# Patient Record
Sex: MALE | Race: WHITE | NOT HISPANIC OR LATINO | Employment: OTHER | URBAN - METROPOLITAN AREA
[De-identification: names, ages, dates, MRNs, and addresses within clinical notes are randomized per-mention and may not be internally consistent; named-entity substitution may affect disease eponyms.]

---

## 2023-10-01 ENCOUNTER — HOSPITAL ENCOUNTER (EMERGENCY)
Facility: HOSPITAL | Age: 78
Discharge: HOME | End: 2023-10-01
Attending: EMERGENCY MEDICINE
Payer: MEDICARE

## 2023-10-01 ENCOUNTER — APPOINTMENT (OUTPATIENT)
Dept: RADIOLOGY | Facility: HOSPITAL | Age: 78
End: 2023-10-01
Payer: MEDICARE

## 2023-10-01 VITALS
SYSTOLIC BLOOD PRESSURE: 150 MMHG | RESPIRATION RATE: 16 BRPM | HEART RATE: 48 BPM | OXYGEN SATURATION: 95 % | WEIGHT: 151.01 LBS | TEMPERATURE: 97.9 F | DIASTOLIC BLOOD PRESSURE: 53 MMHG | HEIGHT: 64 IN | BODY MASS INDEX: 25.78 KG/M2

## 2023-10-01 DIAGNOSIS — S62.356A CLOSED NONDISPLACED FRACTURE OF SHAFT OF FIFTH METACARPAL BONE OF RIGHT HAND, INITIAL ENCOUNTER: Primary | ICD-10-CM

## 2023-10-01 PROCEDURE — 73630 X-RAY EXAM OF FOOT: CPT | Mod: RT

## 2023-10-01 PROCEDURE — 99283 EMERGENCY DEPT VISIT LOW MDM: CPT | Performed by: EMERGENCY MEDICINE

## 2023-10-01 PROCEDURE — 2500000001 HC RX 250 WO HCPCS SELF ADMINISTERED DRUGS (ALT 637 FOR MEDICARE OP): Performed by: EMERGENCY MEDICINE

## 2023-10-01 RX ORDER — IBUPROFEN 600 MG/1
600 TABLET ORAL ONCE
Status: COMPLETED | OUTPATIENT
Start: 2023-10-01 | End: 2023-10-01

## 2023-10-01 RX ADMIN — IBUPROFEN 600 MG: 600 TABLET, FILM COATED ORAL at 10:51

## 2023-10-01 ASSESSMENT — PAIN SCALES - PAIN ASSESSMENT IN ADVANCED DEMENTIA (PAINAD): BREATHING: NORMAL

## 2023-10-01 ASSESSMENT — PAIN SCALES - GENERAL
PAINLEVEL_OUTOF10: 5 - MODERATE PAIN
PAINLEVEL_OUTOF10: 1

## 2023-10-01 ASSESSMENT — PAIN - FUNCTIONAL ASSESSMENT: PAIN_FUNCTIONAL_ASSESSMENT: 0-10

## 2023-10-01 NOTE — ED PROVIDER NOTES
EMERGENCY DEPARTMENT ENCOUNTER      [ ] CODE STEMI [ ] CODE Neuro [ ] CODE Yellow [ ] Modified Trauma [ ] CODE Blue      CHIEF COMPLAINT      Chief Complaint   Patient presents with    Foot Injury     Pt state last night he tripped and fell injuring his right foot.  Contusion noted on the top of the right foot.  MSPs intact.         Mode of Arrival:Car  Primary Care Provider: Jason Li MD  Medical Record Number: [id number]      History obtained by: Patient  Limited by nothing  Time seen: @NOWtimed@      QUALITY MEASURES   PPE Utilized: N95 with goggles and gloves      HPI      Troy Colmenares is a 78 y.o. male with a history of BPH on Flomax, otherwise on simvastatin, denies other medical history and not on blood thinners, states that yesterday while he was out in his yard he had tripped on his [employer] and landed on his right foot laterally and states that since then he has had some pain and some trouble walking due to the pain mostly in the lateral aspect of his right foot.  Denies any numbness or tingling.  Started to notice a bruise forming in the dorsal aspect of the foot.  Otherwise has full range of motion of the ankle denies any injury elsewhere in the fall, did not hit his head or lose consciousness.  Did not take any pain medication for relief but did ice the area.  No other complaints.           The patient has no other complaints at this time.               PAST MEDICAL HISTORY    No past medical history on file.      I have personally reviewed the patient's past medical history in the records.  Paddy Grissom MD    SURGICAL HISTORY    No past surgical history on file.    I have personally reviewed the patient's past surgical history in the records.  Paddy Grissom MD    CURRENT MEDICATIONS    I have reviewed the patient’s medications.   Please see nursing and pharmacy records for the most up to date list.     [unfilled]    ALLERGIES    No Known Allergies    I have personally reviewed the  "patient's past history of allergies in the records.  Paddy Grissom MD    FAMILY HISTORY    No family history on file.    I have personally reviewed the patient's family history in the records.  Paddy Grissom MD    SOCIAL HISTORY    Social History     Socioeconomic History    Marital status:      Spouse name: Not on file    Number of children: Not on file    Years of education: Not on file    Highest education level: Not on file   Occupational History    Not on file   Tobacco Use    Smoking status: Not on file    Smokeless tobacco: Not on file   Substance and Sexual Activity    Alcohol use: Not on file    Drug use: Not on file    Sexual activity: Not on file   Other Topics Concern    Not on file   Social History Narrative    Not on file     Social Determinants of Health     Financial Resource Strain: Not on file   Food Insecurity: Not on file   Transportation Needs: Not on file   Physical Activity: Not on file   Stress: Not on file   Social Connections: Not on file   Intimate Partner Violence: Not on file   Housing Stability: Not on file         I have personally reviewed the patient's social history in the records.  Paddy Grissom MD    REVIEW OF SYSTEMS      6 point ROS was reviewed and negative except as noted above in HPI.      PHYSICAL EXAM    VITAL SIGNS:    /53 (BP Location: Left arm, Patient Position: Sitting)   Pulse (!) 48   Temp 36.6 °C (97.9 °F) (Oral)   Resp 16   Ht 1.626 m (5' 4\")   Wt 68.5 kg (151 lb 0.2 oz)   SpO2 95%   BMI 25.92 kg/m²    Review EMR for vital signs  Nursing note and vitals reviewed.    Constitutional:  Alert and oriented, well-developed, well-nourished, appears stated age, non-toxic appearing  HENT:  Normocephalic, atraumatic, bilateral external ears normal, oropharynx moist, Nose normal.  Neck: normal range of motion, no tenderness, supple, no stridor.  Eyes:  PERRL, EOMI, conjunctiva normal, no discharge.   Cardiovascular:  Normal heart rate, normal rhythm on pulse " check  Respiratory:  Normal breath sounds, no respiratory distress  Integument:  Warm, dry, no erythema, no rash, no edema.   Musculoskeletal: Right foot does show very early contusion of the dorsal aspect but no tenderness in the area with DP PT pulses 2+ capillary refills less than 2 seconds neurovascular intact.  On the distal lateral edge of foot there is some tenderness palpation but no obvious crepitus or deformity or open wound.  intact distal pulses, no tenderness, no cyanosis, no clubbing. Good range of motion in all major joints. No tenderness to palpation or major deformities noted.    Neurologic:  Alert & oriented x 3, normal motor function, normal sensory function, no focal deficits noted. Cranial nerves II-XII intact.      EKG  None         Reviewed and interpreted by me, Paddy Grissom MD           RADIOLOGY  XR foot right 3+ views   Final Result   Acute, comminuted but nondisplaced fractures of the mid and distal   diaphysis of the 5th metatarsal.        MACRO:   None.        Signed by: Darrell Billy 10/1/2023 9:43 AM   Dictation workstation:   [id number]          All Imaging studies evaluated and interpreted by ED physician except when noted otherwise.    ED PROVIDER INTERPRETATION (XRAYS ONLY):       *I have interpreted the x-ray real-time in the ED myself, and made a clinical decision on it prior to the formal radiology reading.    Paddy Grissom M.D.    RADIOLOGIST IMPRESSION (U/S, CT, MRI):   XR foot right 3+ views   Final Result   Acute, comminuted but nondisplaced fractures of the mid and distal   diaphysis of the 5th metatarsal.        MACRO:   None.        Signed by: Darrell Billy 10/1/2023 9:43 AM   Dictation workstation:   [id number]            PERTINENT LABS    Please refer to the chart for all lab work and to MDM for relevant discussion.      PROCEDURE    None (procdoc)    ED COURSE & MEDICAL DECISION MAKING    Pertinent Labs & Imaging studies reviewed. (See chart for  "details)    MDM:    Assessment: Troy Colmenares is a 78 y.o.male who presents to the ED with suspected morsel sprain or contusion of the right foot rather than an actual fracture but did tell patient for now obtain an x-ray while providing ibuprofen for pain relief and will reassess wall also providing crutches for patient to avoid placing weight on the foot for the next few days.  Patient understands and agrees with plan.        Prior records in EPIC reviewed by me.     2023 Coding Requirements:  --Independent historian(s):    see HPI  --Review of prior records:    EHR reviewed   --Relevant comorbidities:    see records  --Social determinants of health:          LE injury     I have considered the following with regards to this patient's   condition: Contusion, hematoma, laceration, arthritis, gout, cellulitis, DVT,   joint dislocation with possible vascular injury, fracture of the lower extremity,   compartment syndrome, internal derangement of joint, patellar dislocation,   peroneal nerve injury, sprain, strain.    X-ray did show shaft fracture of the fifth metatarsal that is nondisplaced so ordered patient cam boot along with crutches recommended ibuprofen rest ice and elevation gave name and number for podiatrist to follow-up with her final disposition although will likely heal with conservative measures.  Patient understands and agrees with plan          ED VITALS  Vitals:    10/01/23 0824   BP: 150/53   BP Location: Left arm   Patient Position: Sitting   Pulse: (!) 48   Resp: 16   Temp: 36.6 °C (97.9 °F)   TempSrc: Oral   SpO2: 95%   Weight: 68.5 kg (151 lb 0.2 oz)   Height: 1.626 m (5' 4\")       BP  Min: 150/53  Max: 150/53    As part of the 2022 MIPS reporting requirements, the following measures have been reviewed and documented:    None     1. Closed nondisplaced fracture of shaft of fifth metacarpal bone of right hand, initial encounter          DISPOSITION       DISCHARGE.  The patient is discharged " back to their place of residence.  Discharge diagnosis, instructions and plan were discussed and understood. At the time of discharge the patient was comfortable and was in no apparent distress. Patient is aware of diagnostic uncertainty and was notified though testing is negative here, there is a very small chance that pathology may be missed.  The patient understands these risks and the patient /family understood to return immediately to the emergency department if the symptoms worsen or if they have any additional concerns.    DISCHARGE MEDICATIONS  New Prescriptions    No medications on file       FOLLOW UP  No follow-up provider specified.    Paddy Grissom    This note was created with the assistance of voice recognition technology.  While attempts were made to ensure accuracy, mis-transcription may be present due to limitations in the software.        Electronically signed by MD Paddy Marrero MD  10/01/23 1037

## 2024-11-11 ENCOUNTER — HOSPITAL ENCOUNTER (OUTPATIENT)
Dept: RADIOLOGY | Facility: CLINIC | Age: 79
Discharge: HOME | End: 2024-11-11
Payer: MEDICARE

## 2024-11-11 ENCOUNTER — OFFICE VISIT (OUTPATIENT)
Dept: ORTHOPEDIC SURGERY | Facility: CLINIC | Age: 79
End: 2024-11-11
Payer: MEDICARE

## 2024-11-11 ENCOUNTER — APPOINTMENT (OUTPATIENT)
Dept: ORTHOPEDIC SURGERY | Facility: CLINIC | Age: 79
End: 2024-11-11
Payer: MEDICARE

## 2024-11-11 DIAGNOSIS — M41.56 SCOLIOSIS OF LUMBAR REGION DUE TO DEGENERATIVE DISEASE OF SPINE IN ADULT: ICD-10-CM

## 2024-11-11 DIAGNOSIS — M25.559 HIP PAIN, UNSPECIFIED LATERALITY: ICD-10-CM

## 2024-11-11 DIAGNOSIS — M70.61 GREATER TROCHANTERIC BURSITIS OF RIGHT HIP: ICD-10-CM

## 2024-11-11 DIAGNOSIS — M16.11 PRIMARY LOCALIZED OSTEOARTHRITIS OF RIGHT HIP: Primary | ICD-10-CM

## 2024-11-11 PROCEDURE — 73502 X-RAY EXAM HIP UNI 2-3 VIEWS: CPT | Mod: RIGHT SIDE | Performed by: RADIOLOGY

## 2024-11-11 PROCEDURE — 73502 X-RAY EXAM HIP UNI 2-3 VIEWS: CPT | Mod: RT

## 2024-11-11 PROCEDURE — 99203 OFFICE O/P NEW LOW 30 MIN: CPT | Performed by: ORTHOPAEDIC SURGERY

## 2024-11-11 PROCEDURE — 99213 OFFICE O/P EST LOW 20 MIN: CPT | Performed by: ORTHOPAEDIC SURGERY

## 2024-11-11 NOTE — PROGRESS NOTES
ORTHOPAEDIC HISTORY AND PHYSICAL    History Of Present Illness  Orthopaedic Problems/Injuries:  Troy Colmenares is a 79 y.o. male presenting with right hip pain.  Patient reports that his pain started in the right hip about 2 months ago.  He reported that he broke his right foot and was favoring the hip 2 months ago and pain started over the lateral aspect of the hip.  The pain also is in the groin area.  The pain happened intermittently.  He reports that he is highly functional and rides about 2000 miles every year.  He is still functional with the pain.  He denies numbness or tingling down the leg.  Of note patient have history of left hip replacement surgery 4 years ago.  He has done very well from that hip.      Review of Systems: 12 point ROS negative unless stated in HPI    Past Medical History  He has no past medical history on file.    Surgical History  He has no past surgical history on file.     Social History  He has no history on file for tobacco use, alcohol use, and drug use.    Family History  No family history on file.     Allergies  Patient has no known allergies.    Review of Systems     Physical Exam  Gen: The patient is alert and oriented ×3, is in no acute distress, and appear their stated age and weight.    Psychiatric: Mood and affect are appropriate.    Eyes: Sclera are white, and pupils are round and symmetric.    ENT: Mucous membranes are moist.     Neck: Supple. Thyroid is midline.    Respiratory: Respirations are nonlabored, chest rise is symmetric.    Cardiac: Rate is regular by palpation of distal pulses.     Abdomen: Nondistended.    Integument: No obvious cutaneous lesions are noted. No signs of lymphangitis. No signs of systemic edema.    Patient is able to ambulate without an antalgic gait .  Examination of the hip reveals no skin abnormalities.  Mild tenderness to palpation over the greater trochanter.  Range of motion of the hip reveals flexion past 90 degrees, patient has full  extension, 40 degrees of hip abduction, 30 degrees of abduction, internal rotation to 40 degrees and external rotation to 60 degrees.  There is no pain with rotational range of motion of the hip.        Relevant Results     I personally reviewed AP pelvis and right hip radiographs are reviewed mild degenerative changes of the hip with preservation of joint space.  No osteophyte formation.  Stable left hip prosthesis.  Patient was noted to have degenerative scoliosis of the lumbar spine.  Assessment/Plan   Patient has right mild degenerative changes of the hip and trochanteric bursitis.  I recommended conservative treatment.  Patient also has degenerative scoliosis that does not appear to be significantly symptomatic.  I referred him to outpatient physical therapy range of motion IT band stretching and strengthening exercises of the hip.  I recommend core strengthening exercises for his lumbar degenerative disease.  Patient with to clinic anti-inflammatories as needed.  I discussed with him that if his symptoms persist we can discuss injections in the future.  He will follow-up with me as needed in the future.

## 2025-06-10 ENCOUNTER — OFFICE VISIT (OUTPATIENT)
Dept: ORTHOPEDIC SURGERY | Facility: HOSPITAL | Age: 80
End: 2025-06-10
Payer: MEDICARE

## 2025-06-10 DIAGNOSIS — M46.1 ARTHRITIS OF RIGHT SACROILIAC JOINT: Primary | ICD-10-CM

## 2025-06-10 PROCEDURE — 99214 OFFICE O/P EST MOD 30 MIN: CPT | Performed by: FAMILY MEDICINE

## 2025-06-10 PROCEDURE — 1159F MED LIST DOCD IN RCRD: CPT | Performed by: FAMILY MEDICINE

## 2025-06-10 PROCEDURE — 99212 OFFICE O/P EST SF 10 MIN: CPT | Performed by: FAMILY MEDICINE

## 2025-06-10 RX ORDER — SIMVASTATIN 10 MG/1
1 TABLET, FILM COATED ORAL NIGHTLY
COMMUNITY
Start: 2024-09-06

## 2025-06-10 RX ORDER — TAMSULOSIN HYDROCHLORIDE 0.4 MG/1
0.4 CAPSULE ORAL DAILY
COMMUNITY
Start: 2024-12-07

## 2025-06-10 NOTE — PROGRESS NOTES
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 His wife is a dance instructor at [employer] who is also my patient for knee pain.     He was under my care from 2015 through 2019 for serial cortisone injections for left hip DJD that eventually underwent joint replacement with Dr. Doherty.    Today, 06/10/2025, he is complaining of posterior right hip and pelvic pain for several months.  He denies injury or trauma.  He was hoping I might be able to give a cortisone injection like I did with the left hip joint.  He has had no other physician treatments.  He does take Advil which helps.    He has no other complaints.     Physical Examination:     The pelvis and both hips are without obvious signs of acute bony deformity, swelling or instability.  The right SI joint is very tender the left is mildly tender.  The rest of the posterior pelvis is nontender and stable.  There is no swelling.  Both hips have full pain-free range of motion.  Logroll is negative.  Straight leg raise test is negative.  Crossover is positive on the right.  Strength is normal and symmetrical.  Gait is pain-free and tandem.    Imaging:  Radiographs of the pelvis and right hip recently obtained were reviewed and revealed no signs of acute fractures or dislocations.  There is no significant arthrosis of the right hip joint.  There is moderate arthrosis at the sacroiliac joint, right worse than left.  The studies were reviewed by me personally in the office today.    === 11/11/24 ===  XR HIP RIGHT WITH PELVIS WHEN PERFORMED 2 OR 3 VIEWS  - Impression -  Mild osteoarthritis right hip.  Mild osteoarthritis bilateral sacroiliac joints     Signed by: Andres Tillman 11/14/2024 5:57 AM  Dictation workstation:   [id number]      Visit Diagnoses   1. Arthritis of right sacroiliac joint  Referral to Physical  Therapy          Assessment and Plan:    We reviewed the exam and x-ray findings and discussed the conservative and surgical treatment options. We agreed that his pain is coming from his SI joint and not the right hip joint.  I recommend conservative management with topical diclofenac and formal physical therapy.  If this gives him no relief then he will see one of our medical spine providers for a local cortisone injection into the SI joint.    **This note was dictated using Dragon speech recognition software and was not corrected for spelling or grammatical errors**.    Jose Gayle MD  Sports Medicine Specialist  [facility] [place]

## 2025-06-26 ENCOUNTER — EVALUATION (OUTPATIENT)
Dept: PHYSICAL THERAPY | Facility: CLINIC | Age: 80
End: 2025-06-26
Payer: MEDICARE

## 2025-06-26 DIAGNOSIS — M54.41 RIGHT-SIDED LOW BACK PAIN WITH RIGHT-SIDED SCIATICA, UNSPECIFIED CHRONICITY: Primary | ICD-10-CM

## 2025-06-26 PROCEDURE — 97110 THERAPEUTIC EXERCISES: CPT | Mod: GP

## 2025-06-26 PROCEDURE — 97162 PT EVAL MOD COMPLEX 30 MIN: CPT | Mod: GP

## 2025-06-26 SDOH — ECONOMIC STABILITY: GENERAL: QUALITY OF LIFE: FAIR

## 2025-06-26 ASSESSMENT — ENCOUNTER SYMPTOMS
ALLEVIATING FACTORS: RELAXATION
PAIN SCALE AT HIGHEST: 4
ALLEVIATING FACTORS: HEAT
QUALITY: TIGHT
ALLEVIATING FACTORS: CHANGE IN POSITION
EXACERBATED BY: MOVEMENT
QUALITY: DISCOMFORT
ALLEVIATING FACTORS: ICE
PAIN SCALE AT LOWEST: 1
ALLEVIATING FACTORS: MEDICATIONS
ALLEVIATING FACTORS: SUPPORT
EXACERBATED BY: LIFTING
QUALITY: RADIATING
QUALITY: DULL ACHE
ALLEVIATING FACTORS: REST
PAIN SCALE: 3

## 2025-06-26 NOTE — PROGRESS NOTES
Physical Therapy Evaluation and Treatment     Patient Name: Troy Colmenares  MRN: [id number]  Encounter date: 2025  Time Calculation  Start Time: 1030  Stop Time: 1105  Time Calculation (min): 35 min  PT Evaluation Time Entry  PT Evaluation (Moderate) Time Entry: 25  PT Therapeutic Procedures Time Entry  Therapeutic Exercise Time Entry: 15    Visit # 1 of MN  Visits/Dates Authorized: MEDICARE A/B 80/20 COVERAGE V MED NEC -SECONDARY FOLLOWS MEDICARE GUIDELINES    Current Problem:   Problem List Items Addressed This Visit    None  Visit Diagnoses         Codes      Right-sided low back pain with right-sided sciatica, unspecified chronicity    -  Primary M54.41          Precautions: L THR         Steadi Fall Risk  One or more falls in the last year? No  How many Times?    Was the patient injured in the fall?    Has trouble stepping onto curb? No  Advised to use a cane or walker to get around safely? No  Often has to rush to toilet? No  Feels unsteady when walking? No  Has lost some feeling in feet? No  Often feels sad or depressed? No  Steadies self on furniture while walking at home? No  Takes medicine that makes them feel lightheaded or more tired than usual? No  Worried about Falling? No  Takes medicine to sleep or improve mood? No  Needs to push with hands when rising from a chair? No      Subjective Evaluation    History of Present Illness  Date of onset: 10/1/2024  Mechanism of injury: C/C - intermittent pain (R>L) - Denies injury  Symptoms increase with golf  Hobby - biking  Walks dog  L THR -     Xray -  24 ===  XR HIP RIGHT WITH PELVIS WHEN PERFORMED 2 OR 3 VIEWS  - Impression -  Mild osteoarthritis right hip.  Mild osteoarthritis bilateral sacroiliac joints    Signed by: Andres Tillman 2024 5:57 AM   Visit Diagnoses   1. Arthritis of right sacroiliac joint Referral to Physical Therapy           Quality of life: fair    Pain  Current pain rating: 3  At best pain ratin  At worst pain  ratin  Location: R>L hip/SI  Quality: dull ache, radiating, tight and discomfort  Relieving factors: medications, relaxation, rest, support, ice, change in position and heat  Aggravating factors: lifting and movement  Progression: no change    Social Support  Lives in: multiple-level home  Lives with: spouse    Hand dominance: right    Treatments  Previous treatment: medication  Current treatment: medication  Patient Goals  Patient goals for therapy: decreased pain, improved balance, increased motion, increased strength, independence with ADLs/IADLs and return to sport/leisure activities  Patient goal: Retired            Objective     Static Posture   General Observations  Symmetrical weight bearing, flexed, in toed and guarded.     Head  Forward.    Shoulders  Asymmetric shoulders, elevated and rounded.    Scapulae  Left winging, right winging, left anteriorly tipped and right anteriorly tipped.    Thoracic Spine  Flattened thoracic spine.    Lumbar Spine   Increased lordosis.     Pelvis   Posterior pelvic tilt    Hip   Hip (Left): Externally rotated.   Hip (Right): Externally rotated.        Other Outcome Measures:   LEFS = 60  SOSA = 10%    Treatments:   Therapeutic Exercise 81735:     HEP / Access Codes URL: [email or web address]  Access Code: [email or web address]  URL: [email or web address]  Date: 2025  Prepared by: Julio Faulkner    Exercises  - Seated Passive Cervical Retraction  - 1 x daily - 7 x weekly - 1 sets - 5 reps - 3 hold  - Seated Scapular Retraction  - 1 x daily - 7 x weekly - 1 sets - 5 reps - 3 hold  - Standing 'L' Stretch at Counter  - 1 x daily - 7 x weekly - 1 sets - 5 reps - 5 hold  - Supine Posterior Pelvic Tilt  - 1 x daily - 7 x weekly - 1 sets - 5 reps - 5 hold  - Supine Piriformis Stretch with Foot on Ground  - 1 x daily - 7 x weekly - 1 sets - 2 reps - 5 hold  - Cat Cow  - 1 x daily - 7 x weekly - 1 sets - 5 reps - 5 hold  - Prone Press Up On Elbows  - 1 x  daily - 7 x weekly - 1 sets - 10 reps - 5 hold  - Modified Adi Stretch  - 1 x daily - 7 x weekly - 1 sets - 1 reps - 180 hold    Assessment & Plan     Assessment  Impairments: abnormal coordination, abnormal muscle tone, abnormal or restricted ROM, activity intolerance, impaired physical strength, lacks appropriate home exercise program and pain with function  Prognosis: good    Goals  Retired    Plan  Therapy options: will be seen for skilled physical therapy services  Planned therapy interventions: abdominal trunk stabilization, flexibility, functional ROM exercises, home exercise program, joint mobilization, manual therapy, neuromuscular re-education, soft tissue mobilization, strengthening, stretching and therapeutic activities  Frequency: 2x week  Duration in visits: 6  Duration in weeks: 4  Treatment plan discussed with: patient          Moderate complexity due to patient's clinical presentation being evolving with changing characteristics, with comorbidities/complexities to include Chronic R SI pian , LBP, all of which may negatively impact rehab tolerance and progression.     SUMMARY -   Extension > flexion program  Manual - Passive - hips/lumbar/SI - HS, Glutes/psoas/quad/pirif, T/L PA mobs  ME - Pelvis  There Ex - Sball ROM, DLSP  Balance/proprio program   Posture education       Goals:   Frequency - 1-2x/wk x 4-5 wks    Goals:  SHORT TERM GOALS:  Patient will report decrease in pain from 4/10 to </= 0/10 to improve quality of life.   Patient will improve LS/hips AROM to WFL in order to improve gait mechanics and functional mobility  Patient will demonstrate sit to stand x10 without UE to demonstrate improved LE strength.  Patient independent with prescribed HEP  Pt Education - Independent postural and  awareness and joint protection program  LONG TERM GOALS:  Patient will be independent with HEP to promote self management of condition  Patient will perform reciprocal stair negotiation to  demonstrate improved LE strength.   Patient will ambulate with least restrictive device and proper gait mechanics to promote return to prior level of function.    Functional Level - reported % (hobbies/work/recreation)

## 2025-06-27 ASSESSMENT — ENCOUNTER SYMPTOMS
LOSS OF SENSATION IN FEET: 0
OCCASIONAL FEELINGS OF UNSTEADINESS: 0
DEPRESSION: 0

## 2025-07-02 ENCOUNTER — TREATMENT (OUTPATIENT)
Dept: PHYSICAL THERAPY | Facility: CLINIC | Age: 80
End: 2025-07-02
Payer: MEDICARE

## 2025-07-02 DIAGNOSIS — M54.41 LUMBAGO WITH SCIATICA, RIGHT SIDE: ICD-10-CM

## 2025-07-02 PROCEDURE — 97110 THERAPEUTIC EXERCISES: CPT | Mod: GP

## 2025-07-02 PROCEDURE — 97140 MANUAL THERAPY 1/> REGIONS: CPT | Mod: GP

## 2025-07-02 ASSESSMENT — PAIN - FUNCTIONAL ASSESSMENT: PAIN_FUNCTIONAL_ASSESSMENT: 0-10

## 2025-07-02 ASSESSMENT — PAIN SCALES - GENERAL: PAINLEVEL_OUTOF10: 0 - NO PAIN

## 2025-07-02 NOTE — PROGRESS NOTES
Physical Therapy Treatment    Patient Name: Troy Colmenares  MRN: [id number]  Today's Date: 7/2/2025  Time Calculation  Start Time: 1100  Stop Time: 1140  Time Calculation (min): 40 min  PT Therapeutic Procedures Time Entry  Therapeutic Exercise Time Entry: 25  Manual Therapy Time Entry: 15    Insurance:  Visit number: Number of Treatments Authorized: 2/MN  Authorization info: MEDICARE A/B 80/20 COVERAGE V MED NEC -SECONDARY FOLLOWS MEDICARE GUIDELINES  Insurance Type: Payor: MEDICARE / Plan: MEDICARE PART A AND B / Product Type: *No Product type* /     Current Problem   1. Lumbago with sciatica, right side  Follow Up In Physical Therapy          Subjective   General   Reason for Referral: LBP, SI Pain  Referred By: kaitlin  History of Present Illness  Date of onset: 10/1/2024  Mechanism of injury: C/C - intermittent pain (R>L) - Denies injury  Symptoms increase with golf  Hobby - biking  Walks dog  L THR - 2000     Xray -  11/11/24 ===  XR HIP RIGHT WITH PELVIS WHEN PERFORMED 2 OR 3 VIEWS  - Impression -  Mild osteoarthritis right hip.  Mild osteoarthritis bilateral sacroiliac joints    Signed by: Andres Tillman 11/14/2024 5:57 AM   Visit Diagnoses   1.Arthritis of right sacroiliac joint Referral to Physical Therapy  Precautions:     Pain   Pain Assessment: 0-10  0-10 (Numeric) Pain Score: 0 - No pain  Pain Type: Chronic pain  Pain Location: Back  Pain Orientation: Right, Left  Post Treatment Pain Level 0/10    Objective     Treatments:  Therapeutic Exercise:  Therapeutic Exercise  Therapeutic Exercise Performed: Yes  Therapeutic Exercise Activity 1: scifit L4 x 5min  Therapeutic Exercise Activity 2: slant board 2x60'  Therapeutic Exercise Activity 3: modified [place]ber[place] x 2min R/L  Therapeutic Exercise Activity 4: modified [place] with manual pressue x 1min R/L  Therapeutic Exercise Activity 5: 65cm - seated rollouts - center/R/L x 3min  Therapeutic Exercise Activity 6: 65cm - seated on ball - bouncing/CW/CCW/APT/PPT x 3  min     Access Code: [email or web address]  URL: [email or web address]  Date: 07/02/2025  Prepared by: Julio Faulkner    Exercises  - Seated Lateral Pelvic Tilt on Swiss Ball  - 1 x daily - 7 x weekly - 3 sets - 10 reps  - Pelvic Tilt on Swiss Ball  - 1 x daily - 7 x weekly - 3 sets - 10 reps  - Pelvic Circles on Swiss Ball  - 1 x daily - 7 x weekly - 3 sets - 10 reps  - Slow Controlled Bounce on Swiss Ball  - 1 x daily - 7 x weekly - 3 sets - 10 reps  Manual:  Manual Therapy  Manual Therapy Performed: Yes  Manual Therapy Activity 1: passive - R/L HS/glutes/psoas/quad/pirif  Manual Therapy Activity 2: MFR - R/L leg pull       Assessment   Assessment:   PT Assessment  PT Assessment Results: Decreased strength, Decreased range of motion, Decreased endurance, Decreased mobility, Pain, Orthopedic restrictions  Rehab Prognosis: Excellent  Evaluation/Treatment Tolerance: Patient tolerated treatment well, Patient limited by fatigue, Patient limited by pain  Strengths: Ability to acquire knowledge, Attitude of self, Coping skills, Insight into problems, Leisure activity, Physical health, Rehab experience    Plan:   OP PT Plan  Number of Treatments Authorized: 2/MN    OP EDUCATION:  Outpatient Education  Individual(s) Educated: Patient  Education Provided: Anatomy, Body Mechanics, Home Exercise Program, Physiology, POC, Posture  Risk and Benefits Discussed with Patient/Caregiver/Other: yes  Patient/Caregiver Demonstrated Understanding: yes  Plan of Care Discussed and Agreed Upon: yes  Patient Response to Education: Patient/Caregiver Verbalized Understanding of Information, Patient/Caregiver Performed Return Demonstration of Exercises/Activities, Patient/Caregiver Asked Appropriate Questions

## 2025-07-10 ENCOUNTER — TREATMENT (OUTPATIENT)
Dept: PHYSICAL THERAPY | Facility: CLINIC | Age: 80
End: 2025-07-10
Payer: MEDICARE

## 2025-07-10 DIAGNOSIS — M54.41 LUMBAGO WITH SCIATICA, RIGHT SIDE: ICD-10-CM

## 2025-07-10 PROCEDURE — 97140 MANUAL THERAPY 1/> REGIONS: CPT | Mod: GP,CQ

## 2025-07-10 PROCEDURE — 97110 THERAPEUTIC EXERCISES: CPT | Mod: GP,CQ

## 2025-07-10 ASSESSMENT — PAIN - FUNCTIONAL ASSESSMENT: PAIN_FUNCTIONAL_ASSESSMENT: 0-10

## 2025-07-10 ASSESSMENT — PAIN SCALES - GENERAL: PAINLEVEL_OUTOF10: 0 - NO PAIN

## 2025-07-10 NOTE — PROGRESS NOTES
Physical Therapy Treatment    Patient Name: Troy Colmenares  MRN: [id number]  Today's Date: 7/10/2025  Time Calculation  Start Time: 0934  Stop Time: 1020  Time Calculation (min): 46 min  PT Therapeutic Procedures Time Entry  Therapeutic Exercise Time Entry: 30  Manual Therapy Time Entry: 15,      Current Problem  Problem List Items Addressed This Visit    None  Visit Diagnoses         Codes      Lumbago with sciatica, right side     M54.41            Insurance:  Number of Treatments Authorized: 3/MN            Subjective   General  Reason for Referral: LBP, SI Pain  Referred By: kaitlin  General Comment: Patient feels he is improving.  Morning pain is lessening.  NWB HF stretch is most helpful.  SB exercises feel good.  He was able to walk his dog to the park and negotiate 137 steps, which he was not previously able to do.    Performing HEP?: Yes    Precautions     Pain  Pain Assessment: 0-10  0-10 (Numeric) Pain Score: 0 - No pain  Pain Type: Chronic pain  Pain Location: Back  Pain Orientation: Right, Lower    Objective   Pelvis not level - L hip elevated  Right leg appears longer    Treatments:    Therapeutic Exercise  Therapeutic Exercise Activity 1: scifit L4 x 5min  Therapeutic Exercise Activity 2: 65cm - seated rollouts - center/R/L x 3min  Therapeutic Exercise Activity 3: 65cm - seated on ball - bouncing/CW/CCW/APT/PPT x 3 min  Therapeutic Exercise Activity 4: Matrix 12.5# lateral step x 2 with pallof press 2 x 2 min  Therapeutic Exercise Activity 5: NWB HF stretch r/L x 1 min ea  Therapeutic Exercise Activity 6: NWB HF stretch with strap assist x 1 min ea R/L  Therapeutic Exercise Activity 7: 1/4 kneeling HF stretch x 1 min ea R/L  Therapeutic Exercise Activity 8: sit to stand from plinth with ecc control x 10         Manual Therapy  Manual Therapy Activity 1: METS R over L; shotgun (  Manual Therapy Activity 2: passive - R/L HS/glutes/psoas/quad/pirif  Manual Therapy Activity 3: MFR - R/L leg pull                      OP EDUCATION:  Outpatient Education  Education Comment: Access Code: [email or web address]  URL: [email or web address]  Date: 07/10/2025  Prepared by: Mame Molina    Exercises  - Seated Passive Cervical Retraction  - 1 x daily - 7 x weekly - 1 sets - 5 reps - 3 hold  - Seated Scapular Retraction  - 1 x daily - 7 x weekly - 1 sets - 5 reps - 3 hold  - Standing 'L' Stretch at Counter  - 1 x daily - 7 x weekly - 1 sets - 5 reps - 5 hold  - Supine Posterior Pelvic Tilt  - 1 x daily - 7 x weekly - 1 sets - 5 reps - 5 hold  - Supine Piriformis Stretch with Foot on Ground  - 1 x daily - 7 x weekly - 1 sets - 2 reps - 5 hold  - Cat Cow  - 1 x daily - 7 x weekly - 1 sets - 5 reps - 5 hold  - Prone Press Up On Elbows  - 1 x daily - 7 x weekly - 1 sets - 10 reps - 5 hold  - Modified Adi Stretch  - 1 x daily - 7 x weekly - 1 sets - 1 reps - 180 hold  - Half Kneeling Hip Flexor Stretch  - 1 x daily - 7 x weekly - 1 reps - 180 hold  - Sit to Stand Without Arm Support  - 1 x daily - 7 x weekly - 2 sets - 10 reps    Assessment:  PT Assessment  Assessment Comment: Patient is progressing well towards his goals.  Progressed HF stretch, as this feels the best.  Pelvis is not symmetrical    Plan:  OP PT Plan  Number of Treatments Authorized: 3/MN    Goals:       Micki Molina, PTA

## 2025-07-17 ENCOUNTER — TREATMENT (OUTPATIENT)
Dept: PHYSICAL THERAPY | Facility: CLINIC | Age: 80
End: 2025-07-17
Payer: MEDICARE

## 2025-07-17 DIAGNOSIS — M54.41 LUMBAGO WITH SCIATICA, RIGHT SIDE: ICD-10-CM

## 2025-07-17 PROCEDURE — 97112 NEUROMUSCULAR REEDUCATION: CPT | Mod: GP,CQ

## 2025-07-17 PROCEDURE — 97110 THERAPEUTIC EXERCISES: CPT | Mod: GP,CQ

## 2025-07-17 PROCEDURE — 97140 MANUAL THERAPY 1/> REGIONS: CPT | Mod: GP,CQ

## 2025-07-17 ASSESSMENT — PAIN SCALES - GENERAL: PAINLEVEL_OUTOF10: 0 - NO PAIN

## 2025-07-17 ASSESSMENT — PAIN - FUNCTIONAL ASSESSMENT: PAIN_FUNCTIONAL_ASSESSMENT: 0-10

## 2025-07-17 NOTE — PROGRESS NOTES
"  Physical Therapy Treatment    Patient Name: Troy Colmenares  MRN: [id number]  Today's Date: 7/17/2025  Time Calculation  Start Time: 1009  Stop Time: 1103  Time Calculation (min): 54 min  PT Therapeutic Procedures Time Entry  Therapeutic Exercise Time Entry: 33  Neuromuscular Re-Education Time Entry: 6  Manual Therapy Time Entry: 15,      Current Problem  Problem List Items Addressed This Visit    None  Visit Diagnoses         Codes      Lumbago with sciatica, right side     M54.41            Insurance:  Number of Treatments Authorized: 4/MN            Subjective   General  Reason for Referral: LBP, SI Pain  Referred By: kaitlin  General Comment: The hip flexor stretch is the best.  For the first time I'm not waking up with any pain.  Took 3 long bike rides since his last visit.    Performing HEP?: Yes    Precautions     Pain  Pain Assessment: 0-10  0-10 (Numeric) Pain Score: 0 - No pain  Pain Type: Chronic pain  Pain Location: Back  Pain Orientation: Right, Lower    Objective       Treatments:    Therapeutic Exercise  Therapeutic Exercise Activity 1: SciFit [place] L4 x 5 min  Therapeutic Exercise Activity 2: 65cm - seated rollouts - center/R/L x 3min  Therapeutic Exercise Activity 3: 65cm - seated on ball - bouncing/CW/CCW/APT/PPT x 3 min  Therapeutic Exercise Activity 4: slant board 2x60'  Therapeutic Exercise Activity 5: YTB footworm R/L x 40' ea  Therapeutic Exercise Activity 6: seated R/L piriformis stretch 2 x 30\" ea  Therapeutic Exercise Activity 7: YTB ZigZag FWD /BWD x 30' ea  Therapeutic Exercise Activity 8: T Gym L7 squats 4 x 10 (0,1,2,3 cords)  Therapeutic Exercise Activity 9: standing R/L quad stretch x 30\" ea  Therapeutic Exercise Activity 10: stool scoot FWD 4 x 40'  Therapeutic Exercise Activity 11: standing R/L HS stretch x 30\" ea  Therapeutic Exercise Activity 12: NWB HF stretch with strap assist x 2 min ea R/L  Therapeutic Exercise Activity 13: bridge with ADD 5\" x 10    Balance/Neuromuscular " Re-Education  Balance/Neuromuscular Re-Education Activity 1: tilt board balance 2 x 2 min    Manual Therapy  Manual Therapy Activity 1: MFR - B, R/L leg pull  Manual Therapy Activity 2: passive - R/L HS/glutes/psoas/quad/pirif/ITB                     OP EDUCATION:  Outpatient Education  Education Comment: Access Code: [email or web address]  URL: [email or web address]  Date: 07/17/2025  Prepared by: Mame Molina    Exercises  - Seated Passive Cervical Retraction  - 1 x daily - 7 x weekly - 1 sets - 5 reps - 3 hold  - Seated Scapular Retraction  - 1 x daily - 7 x weekly - 1 sets - 5 reps - 3 hold  - Standing 'L' Stretch at Counter  - 1 x daily - 7 x weekly - 1 sets - 5 reps - 5 hold  - Supine Posterior Pelvic Tilt  - 1 x daily - 7 x weekly - 1 sets - 5 reps - 5 hold  - Supine Piriformis Stretch with Foot on Ground  - 1 x daily - 7 x weekly - 1 sets - 2 reps - 5 hold  - Cat Cow  - 1 x daily - 7 x weekly - 1 sets - 5 reps - 5 hold  - Prone Press Up On Elbows  - 1 x daily - 7 x weekly - 1 sets - 10 reps - 5 hold  - Modified Adi Stretch  - 1 x daily - 7 x weekly - 1 sets - 1 reps - 180 hold  - Half Kneeling Hip Flexor Stretch  - 1 x daily - 7 x weekly - 1 reps - 180 hold  - Sit to Stand Without Arm Support  - 1 x daily - 7 x weekly - 2 sets - 10 reps  - Supine Bridge with Mini Swiss Ball Between Knees  - 1 x daily - 7 x weekly - 2 sets - 10 reps - 5 hold  - Sidelying Hip Abduction  - 1 x daily - 7 x weekly - 2 sets - 10 reps - 5 hold    Assessment:  PT Assessment  Assessment Comment: Patient is waking up without the back pain he was experiencing.  Good results with hip flexor stretching.  HS cramping with bridging today.    Plan:  OP PT Plan  Number of Treatments Authorized: 4/MN    Goals:       Micki Molina, PTA

## 2025-07-24 ENCOUNTER — TREATMENT (OUTPATIENT)
Dept: PHYSICAL THERAPY | Facility: CLINIC | Age: 80
End: 2025-07-24
Payer: MEDICARE

## 2025-07-24 DIAGNOSIS — M54.41 LUMBAGO WITH SCIATICA, RIGHT SIDE: ICD-10-CM

## 2025-07-24 PROCEDURE — 97140 MANUAL THERAPY 1/> REGIONS: CPT | Mod: GP

## 2025-07-24 PROCEDURE — 97110 THERAPEUTIC EXERCISES: CPT | Mod: GP

## 2025-07-24 ASSESSMENT — PAIN SCALES - GENERAL: PAINLEVEL_OUTOF10: 0 - NO PAIN

## 2025-07-24 ASSESSMENT — PAIN - FUNCTIONAL ASSESSMENT: PAIN_FUNCTIONAL_ASSESSMENT: 0-10

## 2025-07-24 NOTE — PROGRESS NOTES
"Physical Therapy Treatment    Patient Name: Troy Colmenares  MRN: [id number]  Today's Date: 7/24/2025  Time Calculation  Start Time: 1030  Stop Time: 1110  Time Calculation (min): 40 min  PT Therapeutic Procedures Time Entry  Therapeutic Exercise Time Entry: 25  Manual Therapy Time Entry: 15    Insurance:  Visit number: Number of Treatments Authorized: 5/MN  Authorization info: MEDICARE A/B 80/20 COVERAGE V MED NEC -SECONDARY FOLLOWS MEDICARE GUIDELINES  Insurance Type: Payor: MEDICARE / Plan: MEDICARE PART A AND B / Product Type: *No Product type* /     Current Problem   1. Lumbago with sciatica, right side  Follow Up In Physical Therapy          Subjective   General   Reason for Referral: LBP, SI Pain  Referred By: kaitlin  General Comment: The hip flexor stretch is the best.  For the first time I'm not waking up with any pain.  Took 3 long bike rides since his last visit.  Precautions:     Pain   Pain Assessment: 0-10  0-10 (Numeric) Pain Score: 0 - No pain  Pain Type: Chronic pain  Pain Location: Back  Pain Orientation: Right, Lower  Post Treatment Pain Level 0    Objective     Treatments:  Therapeutic Exercise:  Therapeutic Exercise  Therapeutic Exercise Activity 1: SciFit [place] L4 x 5 min  Therapeutic Exercise Activity 2: 65cm - seated rollouts - center/R/L x 3min  Therapeutic Exercise Activity 3: 65cm - seated on ball - bouncing/CW/CCW/APT/PPT x 3 min  Therapeutic Exercise Activity 4: slant board 2x60'  Therapeutic Exercise Activity 5: YTB footworm R/L x 40' ea  Therapeutic Exercise Activity 6: seated R/L piriformis stretch 2 x 30\" ea  Therapeutic Exercise Activity 7: YTB ZigZag FWD /BWD x 30' ea  Therapeutic Exercise Activity 8: T Gym L7 squats 4 x 10 (0,1,2,3 cords)  Therapeutic Exercise Activity 9: standing R/L quad stretch x 30\" ea  Therapeutic Exercise Activity 10: stool scoot FWD 4 x 40'  Therapeutic Exercise Activity 11: standing R/L HS stretch x 30\" ea  Therapeutic Exercise Activity 12: NWB HF stretch " "with strap assist x 2 min ea R/L  Therapeutic Exercise Activity 13: bridge with ADD 5\" x 10  Therapeutic Exercise Activity 14: PB - stool HF stretch x 4min - alt R/L     Access Code: [email or web address]  URL: [email or web address]  Date: 07/24/2025  Prepared by: Julio Faulkner    Exercises  - Standing Hip Flexor Stretch on Chair  - 1 x daily - 7 x weekly - 1 sets - 2 reps - 5 hold  - Half Kneeling Hip Flexor Stretch with Chair  - 1 x daily - 7 x weekly - 1 sets - 2 reps - 10 hold  - Prone Quadriceps Stretch with Strap  - 1 x daily - 7 x weekly - 1 sets - 2 reps - 5 hold  - Hip Flexor Mobilization with Foam Roll  - 1 x daily - 7 x weekly - 1 sets - 10 reps  - Prone Hip Flexor Stretch Off Table with Strap  - 1 x daily - 7 x weekly - 1 sets - 2 reps - 10 hold  - Quadruped Thoracic Rotation - Reach Under  - 1 x daily - 7 x weekly - 1 sets - 5 reps - 2 hold  - Quadruped Full Range Thoracic Rotation with Reach  - 1 x daily - 7 x weekly - 1 sets - 5 reps  - Standing Thoracic Rotation with [place]el  - 1 x daily - 7 x weekly - 2 sets - 20 reps    Manual:  Manual Therapy  Manual Therapy Performed: Yes  Manual Therapy Activity 1: PAssive - R/L HS/glutes/psoas/quad/pirif  Manual Therapy Activity 2: MFR- R/L leg pull    Assessment   Assessment:   PT Assessment  Assessment Comment: Patient is waking up without the back pain he was experiencing.  Good results with hip flexor stretching.  HS cramping with bridging today.    Plan:   OP PT Plan  Number of Treatments Authorized: 5/MN    OP EDUCATION:  Outpatient Education  Education Comment: Access Code: [email or web address]  URL: [email or web address]  Date: 07/17/2025  Prepared by: Mame Molina    Exercises  - Seated Passive Cervical Retraction  - 1 x daily - 7 x weekly - 1 sets - 5 reps - 3 hold  - Seated Scapular Retraction  - 1 x daily - 7 x weekly - 1 sets - 5 reps - 3 hold  - Standing 'L' Stretch at Counter  - 1 x daily - 7 x weekly - 1 sets - 5 reps - 5 " hold  - Supine Posterior Pelvic Tilt  - 1 x daily - 7 x weekly - 1 sets - 5 reps - 5 hold  - Supine Piriformis Stretch with Foot on Ground  - 1 x daily - 7 x weekly - 1 sets - 2 reps - 5 hold  - Cat Cow  - 1 x daily - 7 x weekly - 1 sets - 5 reps - 5 hold  - Prone Press Up On Elbows  - 1 x daily - 7 x weekly - 1 sets - 10 reps - 5 hold  - Modified Adi Stretch  - 1 x daily - 7 x weekly - 1 sets - 1 reps - 180 hold  - Half Kneeling Hip Flexor Stretch  - 1 x daily - 7 x weekly - 1 reps - 180 hold  - Sit to Stand Without Arm Support  - 1 x daily - 7 x weekly - 2 sets - 10 reps  - Supine Bridge with Mini Swiss Ball Between Knees  - 1 x daily - 7 x weekly - 2 sets - 10 reps - 5 hold  - Sidelying Hip Abduction  - 1 x daily - 7 x weekly - 2 sets - 10 reps - 5 hold    Goals:   SHORT TERM GOALS:  Patient will report decrease in pain from 4/10 to </= 0/10 to improve quality of life. A  Patient will improve LS/hips AROM to WFL in order to improve gait mechanics and functional mobility A  Patient will demonstrate sit to stand x10 without UE to demonstrate improved LE strength. A  Patient independent with prescribed HEP A  Pt Education - Independent postural and  awareness and joint protection program A  LONG TERM GOALS:  Patient will be independent with HEP to promote self management of condition A  Patient will perform reciprocal stair negotiation to demonstrate improved LE strength.  A  Patient will ambulate with least restrictive device and proper gait mechanics to promote return to prior level of function.  A  Functional Level - reported % (hobbies/work/recreation) A